# Patient Record
Sex: FEMALE | Race: WHITE | NOT HISPANIC OR LATINO | Employment: UNEMPLOYED | URBAN - METROPOLITAN AREA
[De-identification: names, ages, dates, MRNs, and addresses within clinical notes are randomized per-mention and may not be internally consistent; named-entity substitution may affect disease eponyms.]

---

## 2019-02-03 ENCOUNTER — HOSPITAL ENCOUNTER (EMERGENCY)
Facility: HOSPITAL | Age: 34
Discharge: HOME | End: 2019-02-03
Attending: EMERGENCY MEDICINE
Payer: MEDICAID

## 2019-02-03 VITALS
HEART RATE: 86 BPM | SYSTOLIC BLOOD PRESSURE: 135 MMHG | RESPIRATION RATE: 18 BRPM | TEMPERATURE: 98 F | HEIGHT: 68 IN | BODY MASS INDEX: 20.92 KG/M2 | OXYGEN SATURATION: 99 % | DIASTOLIC BLOOD PRESSURE: 79 MMHG | WEIGHT: 138 LBS

## 2019-02-03 DIAGNOSIS — R07.9 CHEST PAIN: ICD-10-CM

## 2019-02-03 DIAGNOSIS — F41.9 ANXIETY: Primary | ICD-10-CM

## 2019-02-03 DIAGNOSIS — R51.9 NONINTRACTABLE HEADACHE, UNSPECIFIED CHRONICITY PATTERN, UNSPECIFIED HEADACHE TYPE: ICD-10-CM

## 2019-02-03 LAB
B-HCG UR QL: NEGATIVE
CTP QC/QA: YES

## 2019-02-03 PROCEDURE — 81025 URINE PREGNANCY TEST: CPT | Performed by: NURSE PRACTITIONER

## 2019-02-03 PROCEDURE — 93005 ELECTROCARDIOGRAM TRACING: CPT

## 2019-02-03 PROCEDURE — 99283 EMERGENCY DEPT VISIT LOW MDM: CPT | Mod: 25

## 2019-02-03 NOTE — ED PROVIDER NOTES
"Encounter Date: 2/3/2019    SCRIBE #1 NOTE: I, Huma Morin, daya scribing for, and in the presence of, Linda Frias NP .       History     Chief Complaint   Patient presents with    Headache     radiating to back; seen at another hospital last night for same. States hx of anxiety       Time seen by provider: 3:49 PM on 02/03/2019    Yadira hCeng is a 33 y.o. female with a hx of Anxiety, Depression, and drug use who presents to the ED with c/o HA to the top of the head which radiates to the right neck. The HA has been chronic x 2 years. She also reports associated CP and SOB with anxiety. Pt was seen at [facility] yesterday for the sx. She was started on abx and Motrin. Last Motrin was taken "at lunch." She received injections for chronic HAs. Her last injection was 2 weeks ago. She has not received a head CT in the past. Pt takes Suboxone and Effexor. Pt denies nausea and vomiting. Before 3 days she last used crystal meth 5 days before. She states she is "done and want to turn my life around." Tobacco use admitted, 1 ppd. NDKA noted.       The history is provided by the patient.     Review of patient's allergies indicates:  No Known Allergies  Past Medical History:   Diagnosis Date    Anxiety      Past Surgical History:   Procedure Laterality Date    DNC       No family history on file.  Social History     Tobacco Use    Smoking status: Current Every Day Smoker     Packs/day: 0.50     Types: Cigarettes   Substance Use Topics    Alcohol use: No    Drug use: Yes     Types: Methamphetamines     Review of Systems   Constitutional: Negative for chills and fever.   HENT: Negative for sore throat.    Respiratory: Positive for shortness of breath.    Cardiovascular: Positive for chest pain.   Gastrointestinal: Negative for nausea.   Genitourinary: Negative for dysuria.   Musculoskeletal: Positive for neck pain. Negative for back pain.   Skin: Negative for rash.   Neurological: Positive for headaches. " Negative for weakness.   Hematological: Does not bruise/bleed easily.       Physical Exam     Initial Vitals [02/03/19 1540]   BP Pulse Resp Temp SpO2   (!) 143/95 98 18 98.2 °F (36.8 °C) 100 %      MAP       --         Physical Exam    Nursing note and vitals reviewed.  Constitutional: She appears well-developed and well-nourished. She is not diaphoretic. She is active. No distress.   HENT:   Head: Normocephalic and atraumatic.   Right Ear: External ear normal.   Left Ear: External ear normal.   Nose: Nose normal.   Mouth/Throat: Oropharynx is clear and moist. No oropharyngeal exudate.   Eyes: Conjunctivae, EOM and lids are normal. Pupils are equal, round, and reactive to light. Right eye exhibits no chemosis and no discharge. Left eye exhibits no chemosis and no discharge. Right conjunctiva is not injected. Left conjunctiva is not injected.   Neck: Trachea normal and normal range of motion. Neck supple. No stridor present. No tracheal deviation present. No neck rigidity.   Cardiovascular: Normal rate, regular rhythm, normal heart sounds and normal pulses. Exam reveals no distant heart sounds and no friction rub.    No murmur heard.  Pulmonary/Chest: Breath sounds normal. No stridor. She has no wheezes. She has no rhonchi. She has no rales.   Abdominal: Soft. Bowel sounds are normal.   Musculoskeletal: Normal range of motion.   Lymphadenopathy:     She has no cervical adenopathy.   Neurological: She is alert and oriented to person, place, and time. She has normal strength. GCS score is 15. GCS eye subscore is 4. GCS verbal subscore is 5. GCS motor subscore is 6.   No focal neurological deficits noted.  Cranial nerves III-XII grossly intact.  Equal, rapid alternating movements noted to bilateral upper and lower extremities.    Skin: Skin is warm, dry and intact. Capillary refill takes less than 2 seconds. No rash noted.   Psychiatric: She has a normal mood and affect. Her speech is normal and behavior is normal.  Thought content normal.         ED Course   Procedures  Labs Reviewed   POCT URINE PREGNANCY        ECG Results          EKG 12-lead (Preliminary result)  Result time 02/03/19 16:16:23    ED Interpretation by Brando Willard MD (02/03/19 16:16:23)    Sinus rhythm 92 beats per minute no ST segment elevation or depression or T-wave inversion                            Imaging Results          X-Ray Chest PA And Lateral (Final result)  Result time 02/03/19 16:45:46    Final result by Tico Barnes Jr., MD (02/03/19 16:45:46)                 Impression:      No acute abnormality.      Electronically signed by: Tico Barnes MD  Date:    02/03/2019  Time:    16:45             Narrative:    EXAMINATION:  XR CHEST PA AND LATERAL    CLINICAL HISTORY:  Chest pain, unspecified    TECHNIQUE:  PA and lateral views of the chest were performed.    COMPARISON:  None    FINDINGS:  The lungs are clear, with normal appearance of pulmonary vasculature and no pleural effusion or pneumothorax.    The cardiac silhouette is normal in size. The hilar and mediastinal contours are unremarkable.    Bones are intact. Nipple shadows are noted.                                 Medical Decision Making:   History:   Old Medical Records: I decided to obtain old medical records.  Differential Diagnosis:   Anxiety  cva  acs  Intracranial hemorrhage   Clinical Tests:   Lab Tests: Ordered and Reviewed  Radiological Study: Ordered and Reviewed  Medical Tests: Ordered and Reviewed       APC / Resident Notes:   33 year old well appearing female presents with c/o feeling anxious and headache. Reports symptoms have been intermittent since she last smoked meth 3 days ago. Was seen for same at another ER yesterday. EKG normal. Chest xray normal.  Pt is neuro norm with no focal deficits. Pt laying on stretcher and talking on the phone without distress or difficulty. I do not feel further imaging or evaluation is needed at this time and pt is stable  for discharge. Pt instructed to f/u with PCP. I discussed pt with Dr Willard who agrees with POC. Pt voices understanding and is agreeable to the plan.  She is given specific return precautions.        Scribe Attestation:   Scribe #1: I performed the above scribed service and the documentation accurately describes the services I performed. I attest to the accuracy of the note.    I, AIDE Carpio, personally performed the services described in this documentation. All medical record entries made by the scribe were at my direction and in my presence.  I have reviewed the chart and agree that the record reflects my personal performance and is accurate and complete. AIDE Carpio.  9:07 PM 02/03/2019        ED Course as of Feb 03 2107   Sun Feb 03, 2019   1557 BP: (!) 143/95 [EF]   1557 Temp: 98.2 °F (36.8 °C) [EF]   1557 Temp src: Oral [EF]   1557 Pulse: 98 [EF]   1557 Resp: 18 [EF]   1557 SpO2: 100 % [EF]   1649 X-Ray Chest PA And Lateral [EF]      ED Course User Index  [EF] Brando Willard MD     Clinical Impression:     1. Anxiety    2. Chest pain    3. Nonintractable headache, unspecified chronicity pattern, unspecified headache type          Disposition:   Disposition: Discharged  Condition: Stable                        Linda Frias NP  02/03/19 2108